# Patient Record
(demographics unavailable — no encounter records)

---

## 2024-11-19 NOTE — PHYSICAL EXAM
[Normal] : no acute distress, well nourished, well developed and well-appearing [No JVD] : no jugular venous distention [Supple] : supple [Clear to Auscultation] : lungs were clear to auscultation bilaterally [Regular Rhythm] : with a regular rhythm [Normal S1, S2] : normal S1 and S2 [No Edema] : there was no peripheral edema [No Extremity Clubbing/Cyanosis] : no extremity clubbing/cyanosis [Soft] : abdomen soft [Normal Bowel Sounds] : normal bowel sounds [No CVA Tenderness] : no CVA  tenderness [Normal Oropharynx] : the oropharynx was normal [No Respiratory Distress] : no respiratory distress  [No Accessory Muscle Use] : no accessory muscle use [Normal Posterior Cervical Nodes] : no posterior cervical lymphadenopathy [Normal Anterior Cervical Nodes] : no anterior cervical lymphadenopathy [Coordination Grossly Intact] : coordination grossly intact [Normal Gait] : normal gait [Normal Affect] : the affect was normal [Normal Insight/Judgement] : insight and judgment were intact [de-identified] : There is a 1/6 to 2/6 systolic ejection murmur at the left sternal border.  There is a 2/6 systolic ejection murmur at the base which radiates to the axilla. [de-identified] : seborrheic keratoses are present

## 2024-11-19 NOTE — PLAN
[FreeTextEntry1] : 1. Continue current medications as outlined above.   2. The patient will restart Pantoprazole 40 MG BID PRN for treatment of GERD.   3. The patient will undergo a follow up low dose chest CT in April 2025 as part of the Lung Cancer Screening Program.    4. Follow up in 6 months with wellness, routine fasting bloodwork, and EKG   5. Continue to follow with cardiologist, Dr. Salinas, for treatment of HTN, HLD, and CAD. An echocardiogram should be considered to evaluate his murmurs, and to rule out valvular disease.    6. The RSV vaccine is recommended at this time.   7. Maintain exercise regimen as tolerated.

## 2024-11-19 NOTE — HISTORY OF PRESENT ILLNESS
[FreeTextEntry1] :  The patient comes in for a routine follow-up evaluation. [de-identified] : The patient is feeling well at this time. The patient has a longstanding history of COPD for which he remains compliant with Anoro 62.5-25 MCG/ACT one puff daily. There has been no hemoptysis or wheeze. He does report an occasional productive cough which he attributes to PND.   The patient has a history of HTN for which he is maintained on Losartan-HCTZ 100-12.5 MG once daily. He also has a history of Hyperlipidemia and continues to take rosuvastatin 40 MG once daily. He is generally tolerating the statin agent well, denying any severe muscle aches or any other overt symptoms. He has a history of CAD for which he takes Aspirin 81 MG once daily. He was previously maintained on Plavix for CAD, but reports that his cardiologist, Dr. Salinas, recently discontinued the medication. He continues to exercise routinely. There has been no chest pain, shortness of breath, palpitations, or PND.  He does report occasional reflux symptoms. He has a history of GERD which was treated with Protonix in the past. He has not taken medications for this issue in approximately 2 years.   His appetite is good, and his weight is stable. The patient underwent a colonoscopy in September. Polyps were removed and he is meant to follow up in 3 years. He is also up to date on his dermatology exam. There have been no fevers, chills, or night sweats. There have been no other acute constitutional symptoms. He comes in for this assessment.

## 2024-11-19 NOTE — DATA REVIEWED
[FreeTextEntry1] : A pulmonary function test is performed.  Lung volumes are within normal limits.  Lung mechanics reveal a mild to significant decrease in flow rates.  Slight bronchodilator reactivity is demonstrated.  The DLCO is moderately reduced and when corrected for alveolar volume it is mildly reduced.  Saturation is 96% on room air.  This represents a moderate degree of obstructive lung disease.  The constellation of the reduction in flow rates, the minimal degree of bronchodilator reactivity, and the decrease in the DLCO, is consistent with anatomic emphysema.  When compared to the prior study, the flow rates and DLCO have diminished slightly.

## 2024-11-19 NOTE — ADDENDUM
[FreeTextEntry1] : This note was written by Sandrita Arrieta on 11/19/2024 acting as a medical scribe for Dr. Truman Dunaway MD. All medical entries made by the scribe were at my, Dr. Truman Dunaway's, direction and personally dictated by me on 11/19/2024. I have reviewed the chart and agree that the record accurately reflects my personal performance of the history, review of systems, assessment, and plan. I have also personally directed, reviewed, and agreed with the chart.

## 2025-04-02 NOTE — HISTORY OF PRESENT ILLNESS
[Former] : Former [TextBox_13] : Mr. RIGGINS is a 76 year old male with a history of CAD, HTN, high cholesterol, COPD, ILD, emphysema and melanoma. Reviewed and confirmed that the patient meets screening eligibility criteria:  76 years old   Smoking Status: Former smoker   Number of pack(s) per day: 1 Number of years smoked: 35 Number of pack years smokin Quit year:   No signs/symptoms of lung cancer [YearQuit] : 2011 [PacksperDay] : 1 [N_Years] : 35 [PacksperYear] : 35

## 2025-05-21 NOTE — PHYSICAL EXAM
[Well Nourished] : well nourished [Well Developed] : well developed [Well-Appearing] : well-appearing [Normal Sclera/Conjunctiva] : normal sclera/conjunctiva [EOMI] : extraocular movements intact [Normal Outer Ear/Nose] : the outer ears and nose were normal in appearance [Normal Oropharynx] : the oropharynx was normal [No JVD] : no jugular venous distention [Supple] : supple [Thyroid Normal, No Nodules] : the thyroid was normal and there were no nodules present [No Respiratory Distress] : no respiratory distress  [No Accessory Muscle Use] : no accessory muscle use [Normal Rate] : normal rate  [Regular Rhythm] : with a regular rhythm [Normal S1, S2] : normal S1 and S2 [No Edema] : there was no peripheral edema [No Extremity Clubbing/Cyanosis] : no extremity clubbing/cyanosis [Soft] : abdomen soft [Non Tender] : non-tender [Non-distended] : non-distended [No Masses] : no abdominal mass palpated [No HSM] : no HSM [Normal Bowel Sounds] : normal bowel sounds [Normal Sphincter Tone] : normal sphincter tone [No Mass] : no mass [Normal Posterior Cervical Nodes] : no posterior cervical lymphadenopathy [Normal Anterior Cervical Nodes] : no anterior cervical lymphadenopathy [No CVA Tenderness] : no CVA  tenderness [No Spinal Tenderness] : no spinal tenderness [No Joint Swelling] : no joint swelling [No Rash] : no rash [Coordination Grossly Intact] : coordination grossly intact [Normal Gait] : normal gait [Normal Affect] : the affect was normal [Normal Insight/Judgement] : insight and judgment were intact [No Acute Distress] : no acute distress [Stool Occult Blood] : stool negative for occult blood [Normal Supraclavicular Nodes] : no supraclavicular lymphadenopathy [de-identified] : There is a slight prolongation of the expiratory phase.  There are few focal wheezes at the right base laterally [de-identified] : There is a 1/6 systolic ejection murmur at the base which radiates to the axilla [de-identified] : The prostate is normal in size, without nodularity [de-identified] : Seborrheic keratoses and hyperpigmented nevi are noted.  There is a well-healed scar in his right lower back region to the right of the midline secondary to removal of a melanoma

## 2025-05-21 NOTE — DATA REVIEWED
[FreeTextEntry1] : Low-dose screening CAT scan of the chest performed last month, is again reviewed with the patient.  He has a persistent 1.4 cm groundglass opacity in the right upper lung field which is unchanged.  Routine blood work is reviewed

## 2025-05-21 NOTE — HEALTH RISK ASSESSMENT
[Yes] : Yes [2 - 4 times a month (2 pts)] : 2-4 times a month (2 points) [1 or 2 (0 pts)] : 1 or 2 (0 points) [Never (0 pts)] : Never (0 points) [No] : In the past 12 months have you used drugs other than those required for medical reasons? No [0] : 2) Feeling down, depressed, or hopeless: Not at all (0) [PHQ-2 Negative - No further assessment needed] : PHQ-2 Negative - No further assessment needed [Former] : Former [Change in mental status noted] : No change in mental status noted [Language] : denies difficulty with language [Behavior] : denies difficulty with behavior [Learning/Retaining New Information] : denies difficulty learning/retaining new information [Handling Complex Tasks] : denies difficulty handling complex tasks [Reasoning] : denies difficulty with reasoning [Spatial Ability and Orientation] : denies difficulty with spatial ability and orientation [None] : None

## 2025-05-21 NOTE — PLAN
[FreeTextEntry1] : 1. Continue current medications as outlined above.  2. The patient will begin taking a Medrol dose pack, due to the focal wheeze noted on exam in the right lower lung zone.  This will help to jumpstart the steroid that was added to his aerosol regimen, namely in the Trelegy.  3. The patient will discontinue the use of Anoro 1 puff/day. Instead, the patient will begin using Trelegy 200 MCG/ACT 1 puff daily for maintenance of COPD.     4. Follow up in 6 months with PFT.  We will assess his response to the alteration in the medical regimen from Anoro to Trelegy   5. Continue to follow with cardiologist, Dr. Salinas, for treatment of HTN, HLD, and CAD.    6. The Influenza and COVID vaccines are recommended in the fall.   7. Cardiovascular exercise as tolerated.  8. The patient has been referred to pulmonary rehab once again.  He did look into this several years ago, but it was not covered by his insurance.  He will attempt 1 more time.  This should help with his degree of breathlessness.

## 2025-05-21 NOTE — HISTORY OF PRESENT ILLNESS
[FreeTextEntry1] :  The patient comes in for a yearly wellness evaluation. [de-identified] : The patient is feeling well at this time, except for dyspnea on exertion. The patient has a longstanding history of COPD for which he remains compliant with Anoro 62.5-25 MCG/ACT one puff daily.  He has noted, that his breathlessness occurs only when carrying heavy objects.  He is still able to do his daily 3 mile walk without any issues.  There has been no cough, sputum production, hemoptysis, or wheeze.  The patient has a history of HTN for which he is maintained on Losartan-HCTZ 100-12.5 MG once daily. He also has a history of Hyperlipidemia and continues to take rosuvastatin 40 MG once daily. He is generally tolerating the statin agent well, denying any severe muscle aches or any other overt symptoms. He has a history of CAD for which he takes Aspirin 81 MG once daily. He continues to follow with his cardiologist, Dr. Salinas. The patient recently followed with cardiology c/o BEGUM, as noted above. He underwent a cardiac workup including a nuclear stress test, abdominal sonogram, and echocardiogram. The abdominal sonogram was unremarkable. The stress test was fiarly unremarkable and showed an ejection fraction of 52%. The echocardiogram revealed mild to moderate MR and mild to moderate aortic regurgitation. He continues to experience BEGUM. There has been no chest pain, palpitations, or PND.  He has a history of GERD for which he is maintained on Pantoprazole 40 MG BID. He denies any recent reflux or related symptomology.   The patient does consume alcohol on a regular basis. He does wear a seatbelt when in a motor vehicle. There is no evidence of depression or suicidal ideations. The patient reports a very social life and denies any loneliness or isolation. He denies any difficulty carrying out daily activities such as dressing and grooming. He also denies any difficulty ambulating on a daily basis. Additionally, he denies any issues performing instrumental daily activities such as using the phone, doing laundry, housekeeping, driving, managing medications, shopping, or handling finances.  Last CPE: 4/25/24  Colonoscopy:   Ophthalmology:n/a  Dermatology: up to date  Dentist: n/a  Flu: due in fall  Tdap: up to date  COVID: due in fall  Diet: regular  Exercise: does not exercise

## 2025-05-21 NOTE — ADDENDUM
[FreeTextEntry1] : This note was written by Sandrita Arrieta on 05/21/2025 acting as a medical scribe for Dr. Truman Dunaway MD. All medical entries made by the scribe were at my, Dr. Truman Dunaway's, direction and personally dictated by me on 05/21/2025. I have reviewed the chart and agree that the record accurately reflects my personal performance of the history, review of systems, assessment, and plan. I have also personally directed, reviewed, and agreed with the chart.